# Patient Record
Sex: FEMALE
[De-identification: names, ages, dates, MRNs, and addresses within clinical notes are randomized per-mention and may not be internally consistent; named-entity substitution may affect disease eponyms.]

---

## 2020-03-20 ENCOUNTER — TELEPHONE (OUTPATIENT)
Dept: SCHEDULING | Age: 25
End: 2020-03-20

## 2021-01-23 ENCOUNTER — HOSPITAL ENCOUNTER (EMERGENCY)
Facility: CLINIC | Age: 26
Discharge: HOME | End: 2021-01-23
Attending: EMERGENCY MEDICINE | Admitting: EMERGENCY MEDICINE
Payer: COMMERCIAL

## 2021-01-23 VITALS
OXYGEN SATURATION: 97 % | RESPIRATION RATE: 16 BRPM | BODY MASS INDEX: 19.63 KG/M2 | DIASTOLIC BLOOD PRESSURE: 72 MMHG | TEMPERATURE: 98 F | HEART RATE: 74 BPM | SYSTOLIC BLOOD PRESSURE: 91 MMHG | WEIGHT: 100 LBS | HEIGHT: 60 IN

## 2021-01-23 DIAGNOSIS — N39.0 URINARY TRACT INFECTION WITH HEMATURIA, SITE UNSPECIFIED: ICD-10-CM

## 2021-01-23 DIAGNOSIS — L03.032 PARONYCHIA OF TOE, LEFT: ICD-10-CM

## 2021-01-23 DIAGNOSIS — R31.9 URINARY TRACT INFECTION WITH HEMATURIA, SITE UNSPECIFIED: ICD-10-CM

## 2021-01-23 LAB
ALBUMIN UR-MCNC: NEGATIVE MG/DL
APPEARANCE UR: CLEAR
BILIRUB UR QL STRIP: NEGATIVE
COLOR UR AUTO: ABNORMAL
GLUCOSE UR STRIP-MCNC: NEGATIVE MG/DL
HCG UR QL: NEGATIVE
HGB UR QL STRIP: NEGATIVE
KETONES UR STRIP-MCNC: NEGATIVE MG/DL
LEUKOCYTE ESTERASE UR QL STRIP: ABNORMAL
MUCOUS THREADS #/AREA URNS LPF: PRESENT /LPF
NITRATE UR QL: NEGATIVE
PH UR STRIP: 6.5 PH (ref 5–7)
RBC #/AREA URNS AUTO: 3 /HPF (ref 0–2)
SOURCE: ABNORMAL
SP GR UR STRIP: 1.02 (ref 1–1.03)
SQUAMOUS #/AREA URNS AUTO: 3 /HPF (ref 0–1)
UROBILINOGEN UR STRIP-MCNC: 0 MG/DL (ref 0–2)
WBC #/AREA URNS AUTO: 41 /HPF (ref 0–5)

## 2021-01-23 PROCEDURE — 81025 URINE PREGNANCY TEST: CPT | Performed by: EMERGENCY MEDICINE

## 2021-01-23 PROCEDURE — 87186 SC STD MICRODIL/AGAR DIL: CPT | Performed by: EMERGENCY MEDICINE

## 2021-01-23 PROCEDURE — 87086 URINE CULTURE/COLONY COUNT: CPT | Performed by: EMERGENCY MEDICINE

## 2021-01-23 PROCEDURE — 99283 EMERGENCY DEPT VISIT LOW MDM: CPT | Mod: 25

## 2021-01-23 PROCEDURE — 10060 I&D ABSCESS SIMPLE/SINGLE: CPT

## 2021-01-23 PROCEDURE — 87088 URINE BACTERIA CULTURE: CPT | Performed by: EMERGENCY MEDICINE

## 2021-01-23 PROCEDURE — 81001 URINALYSIS AUTO W/SCOPE: CPT | Performed by: EMERGENCY MEDICINE

## 2021-01-23 RX ORDER — CEPHALEXIN 500 MG/1
500 CAPSULE ORAL 3 TIMES DAILY
Qty: 21 CAPSULE | Refills: 0 | Status: SHIPPED | OUTPATIENT
Start: 2021-01-23 | End: 2021-01-30

## 2021-01-23 ASSESSMENT — ENCOUNTER SYMPTOMS
DYSURIA: 1
HEMATURIA: 1
WOUND: 1

## 2021-01-23 ASSESSMENT — MIFFLIN-ST. JEOR: SCORE: 1125.1

## 2021-01-24 NOTE — ED PROVIDER NOTES
History   Chief Complaint:  Wound Check (Right 2nd toe pain and discoloration for the last 4 days, no known injury.)       SHERON Mayes is a 25 year old female who presents with a wound check. The patient reports for the past 3 days she has had pain and swelling in her second left toe. She thinks the swelling may have improved slightly. She notes she recently started going back to yoga. There was no known trauma to the toe and she has not been wearing new shoes. She does not have a history of diabetes. Additionally, the patient notes she has been having hematuria and dysuria. She has a history of UTIs and took Azo this evening. She is requesting a prescription for Keflex as this has worked well for her in the past.  Denies any vaginal discharge and no concern for STIs.    Review of Systems   Genitourinary: Positive for dysuria and hematuria.   Skin: Positive for wound (left second toe redness and swelling).   All other systems reviewed and are negative.    Allergies:  No Known Drug Allergies     Medications:  The patient does not take any daily medications.    Past Medical History:    The patient denies any medical problems including a history of diabetes.       Social History:  The patient presents with her boyfriend Reji.     Physical Exam     Patient Vitals for the past 24 hrs:   BP Temp Temp src Pulse Resp SpO2 Height Weight   01/23/21 2054 -- -- -- -- -- -- 1.524 m (5') 45.4 kg (100 lb)   01/23/21 2047 91/72 98  F (36.7  C) Temporal 74 16 97 % -- --       Physical Exam  Physical Exam   General:  Sitting on bed with her boyfriend at bedside, comfortable appearing.   HENT:  No obvious trauma to head  Right Ear:  External ear normal.   Left Ear:  External ear normal.   Nose:  Nose normal.   Eyes:  Conjunctivae and EOM are normal.  Neck: Normal range of motion. Neck supple. No tracheal deviation present.   Pulm/Chest: No respiratory distress  M/S: Normal range of motion.   Neuro: Alert. GCS  15.  Skin: Skin is warm and dry. No rash noted. Not diaphoretic. On the left second toe there is an area of erythema and warmth to the distal aspect just proximal to nail and to the medical cuticle area.  Psych: Normal mood and affect. Behavior is normal.     Emergency Department Course     Laboratory:  UA: Clear dark yellow urine, Leukocyte esterase: large, WBC: 41 (H), RBC: 3 (H), Squamous epithelia: 3 (H), Mucous: present, otherwise WNL     HCG qualitative urine: Negative      Urine culture: In process    Procedures  Digital Block  After discussing the risk and benefits of the procedure, the pt consented to a digital block being performed.  The finger was thoroughly prepped with alcohol swabs and then 3.5 ml of 2% lidocaine was used to anesthetize the toe.  The pt tolerated the procedure well and felt much better after achieving anesthesia to the digit. I discussed post-digital block care of the finger.        Incision and Drainage     LOCATIONS:  Left second toe     ANESTHESIA:  Digital block with 3.5 ml of 1% lidocaine without epinephrine     PREPARATION:  Cleansed with alcohol prep     PROCEDURE:  Area was incised with # 11 Blade ([place]) with a Single Straight incision.  Serosanguineous drainage obtained.  Packing consisted of No Packing.  Appropriate dressing was applied to cover the area.    PATIENT STATUS:        Patient tolerated the procedure well. There were no complications.    Emergency Department Course:    Reviewed:  I reviewed nursing notes, vitals and past medical history    Assessments:  2047 I obtained history and examined the patient as noted above.   2105 A digital block and incision and drainage was performed as outlined in the procedure note above.  The patient tolerated well and there were no complications.     Disposition:  The patient was discharged to home.       Impression & Plan   Medical Decision Making:  Nydia Mayes is a very pleasant 25 year old female who presents  for evaluation of a painful left second toe.  The patient has been doing some more yoga.  She did wonder if this could perhaps be a blister, but the pain has been worsening.  And has had a paronychia in the past and thought it was that.  This is consistent with likely an infected blister and possibly a paronychia as well.  I reviewed the risk and benefit of warm compresses/warm water soaks versus incision and drainage.  The patient consented for the latter.  This was performed and revealed a serosanguinous drainage. There is no significant purulence.  The swelling in the paronychia/blister area reduced greatly after this procedure.  Bacitracin and a bandage was applied. No signs of lymphangitis, marked cellulitis, gangrene, or other worrisome soft tissue/bony issue at this time.  Plan to f/u with podiatry or primary for wound recheck.  Bacitracin and wound care discussed BID.  Stable for discharge.      This patient also had concerns of some dysuria.  She has had many urinary tract infections in the past and felt she had another one today.  Urinalysis confirms the infection.  There has been no fever, back/flank pain or significant abdominal pain.  There is no clinical evidence of pyelonephritis, appendicitis,  Colitis, diverticulitis or any intraabdominal catastrophe.  The patient will be started on antibiotics for the infection. Return if increasing pain, vomiting, fever, or inability to tolerate the oral antibiotic.  Follow up with primary physician is indicated if not improving in 2-3 days.     The treatment plan was discussed with the patient and they expressed understanding of this plan and consented to the plan.  In addition, the patient will return to the emergency department if their symptoms persist, worsen, if new symptoms arise or if there is any concern as other pathology may be present that is not evident at this time. They also understand the importance of close follow up in the clinic and if unable to  do so will return to the emergency department for a reevaluation. All questions were answered.    Covid-19  Nydia Mayes was evaluated during a global COVID-19 pandemic, which necessitated consideration that the patient might be at risk for infection with the SARS-CoV-2 virus that causes COVID-19.   Applicable protocols for evaluation were followed during the patient's care.     Diagnosis:    ICD-10-CM    1. Paronychia of toe, left  L03.032 UA with Microscopic     HCG qualitative urine    /infected blister   2. Urinary tract infection with hematuria, site unspecified  N39.0     R31.9        Discharge Medications:  New Prescriptions    CEPHALEXIN (KEFLEX) 500 MG CAPSULE    Take 1 capsule (500 mg) by mouth 3 times daily for 7 days       Scribe Disclosure:  I, Elaina Hanley, am serving as a scribe at 8:47 PM on 1/23/2021 to document services personally performed by Mushtaq Navarrete DO based on my observations and the provider's statements to me.        Mushtaq aNvarrete DO  01/23/21 8943

## 2021-01-25 LAB
BACTERIA SPEC CULT: ABNORMAL
Lab: ABNORMAL
SPECIMEN SOURCE: ABNORMAL

## 2021-01-26 ENCOUNTER — TELEPHONE (OUTPATIENT)
Dept: EMERGENCY MEDICINE | Facility: CLINIC | Age: 26
End: 2021-01-26

## 2021-01-26 RX ORDER — NITROFURANTOIN 25; 75 MG/1; MG/1
100 CAPSULE ORAL 2 TIMES DAILY
Qty: 10 CAPSULE | Refills: 0 | Status: SHIPPED | OUTPATIENT
Start: 2021-01-26 | End: 2021-01-31

## 2021-01-26 NOTE — ED PROVIDER NOTES
Spoke with the nurse providing culture follow-up.  Urine culture revealed Enterococcus faecalis.  The patient is still having urinary symptoms and was prescribed Keflex.  So we decided to discontinue that medication and the Enterococcus is susceptible to nitrofurantoin so a 5-day course will be prescribed.     Nikunj Gamino MD  01/26/21 1100

## 2021-01-26 NOTE — TELEPHONE ENCOUNTER
Nydia notified of new recommendations.  Stop Cephalexin and switch to Macrobid    Contact your PCP clinic or return to the Emergency department if your:    Symptoms do not improved after 3 days on antibiotic.    Symptoms do not resolve after completing antibiotic.    Symptoms worsen or other concerning symptom's.    Marek Gomez RN  [facility]  Emergency Dept Lab Result RN  Ph# [phone]

## 2021-01-26 NOTE — RESULT ENCOUNTER NOTE
At 10:58P, Consulted with [facility] Emergency Department Provider, Dr Gamino, and recommendations were discontinue the Cephalexin and switch to Macrobid 100 mg PO BID for 5 days

## 2021-01-26 NOTE — TELEPHONE ENCOUNTER
[facility] Emergency Department Lab result notification [Adult-Female]    [place] ED lab result protocol used  Urine culture    Reason for call  Notify of lab results, assess symptoms,  review ED providers recommendations/discharge instructions (if necessary) and advise per ED lab result f/u protocol    Lab Result (including Rx patient on, if applicable)  Final Urine Culture Report on 1/25/21  Emergency Dept/Urgent Care discharge antibiotic prescribed: Cephalexin (Keflex) 500 mg capsule, 1 capsule (500 mg) by mouth 3 times daily for 7 days.  #1. Bacteria, 50,000 to 100,000 colonies/mL Enterococcus faecalis,  is [NOT TESTED] to antibiotic.   Change in treatment as per [place] ED Lab result protocol.    Information table from ED Provider visit on 1/26/21  Symptoms reported at ED visit (Chief complaint, HPI) Chief Complaint:  Wound Check (Right 2nd toe pain and discoloration for the last 4 days, no known injury.)   HPI   Nydia Mayes is a 25 year old female who presents with a wound check. The patient reports for the past 3 days she has had pain and swelling in her second left toe. She thinks the swelling may have improved slightly. She notes she recently started going back to yoga. There was no known trauma to the toe and she has not been wearing new shoes. She does not have a history of diabetes. Additionally, the patient notes she has been having hematuria and dysuria. She has a history of UTIs and took Azo this evening. She is requesting a prescription for Keflex as this has worked well for her in the past.  Denies any vaginal discharge and no concern for STIs.   Significant Medical hx, if applicable (i.e. CKD, diabetes) NA   Allergies No Known Allergies   Weight, if applicable Wt Readings from Last 2 Encounters:   01/23/21 45.4 kg (100 lb)      Coumadin/Warfarin [Yes /No] No   Creatinine Level (mg/dl) No results found for: CR   Creatinine clearance (ml/min), if applicable Creatinine  clearance cannot be calculated (No successful lab value found.)   Pregnant (Yes/No/NA) HCG qual negative   Breastfeeding (Yes/No/NA) ?   ED providers Impression and Plan (applicable information) Nydia Mayes is a very pleasant 25 year old female who presents for evaluation of a painful left second toe.  The patient has been doing some more yoga.  She did wonder if this could perhaps be a blister, but the pain has been worsening.  And has had a paronychia in the past and thought it was that.  This is consistent with likely an infected blister and possibly a paronychia as well.  I reviewed the risk and benefit of warm compresses/warm water soaks versus incision and drainage.  The patient consented for the latter.  This was performed and revealed a serosanguinous drainage. There is no significant purulence.  The swelling in the paronychia/blister area reduced greatly after this procedure.  Bacitracin and a bandage was applied. No signs of lymphangitis, marked cellulitis, gangrene, or other worrisome soft tissue/bony issue at this time.  Plan to f/u with podiatry or primary for wound recheck.  Bacitracin and wound care discussed BID.  Stable for discharge.       This patient also had concerns of some dysuria.  She has had many urinary tract infections in the past and felt she had another one today.  Urinalysis confirms the infection.  There has been no fever, back/flank pain or significant abdominal pain.  There is no clinical evidence of pyelonephritis, appendicitis,  Colitis, diverticulitis or any intraabdominal catastrophe.  The patient will be started on antibiotics for the infection. Return if increasing pain, vomiting, fever, or inability to tolerate the oral antibiotic.  Follow up with primary physician is indicated if not improving in 2-3 days.      The treatment plan was discussed with the patient and they expressed understanding of this plan and consented to the plan.  In addition, the patient will  "return to the emergency department if their symptoms persist, worsen, if new symptoms arise or if there is any concern as other pathology may be present that is not evident at this time. They also understand the importance of close follow up in the clinic and if unable to do so will return to the emergency department for a reevaluation. All questions were answered.   ED diagnosis  Paronychia of toe, left   Urinary tract infection with hematuria, site unspecified   ED provider Mushtaq Navarrete, DO      RN Assessment (Patient s current Symptoms), include time called.  [Insert Left message here if message left]  10:45A  Urinary sx's have not improved.  \"They are about the same\"  My toe is just black and blue but it is feeling better.  No pus discharge.  It is not as painful now.    RN Recommendations/Instructions per [facility] ED lab result protocol  10:45A Patient notified of lab result and treatment recommendations.  Will consult ED Provider and call her back.    [facility] Emergency Department Provider Name & Recommendations (included time consulted)  At 10:58P, Consulted with [facility] Emergency Department Provider, Dr Gamino, and recommendations were discontinue the Cephalexin and switch to Macrobid 100 mg PO BID for 5 days    At 11A, Left voicemail message requesting a call back to [facility] ED Lab Result RN at [phone].  RN is available every day between 10 a.m. and 6:30 p.m..       Please Contact your PCP clinic or return to the Emergency department if your:    Symptoms do not improve after 3 days on antibiotic.    Symptoms do not resolve after completing antibiotic.    Symptoms worsen or other concerning symptom's.      [RN Name]  Marek Gomez RN  [facility]  Emergency Dept Lab Result RN  Ph# [phone]    Copy of Lab result   Urine Culture  Order: [id number]  Status:  Final result   Visible to patient:  No [facility]   Dx:  " Paronychia of toe, left  Specimen Information: Midstream Urine        Component 3d ago   Specimen Description Midstream Urine    Special Requests Specimen received in preservative    Culture Micro Abnormal 50,000 to 100,000 colonies/mL   Enterococcus faecalis     [facility]   Susceptibility     Enterococcus faecalis     LATOYA     AMPICILLIN <=2 ug/mL Sensitive     NITROFURANTOIN <=16 ug/mL Sensitive     PENICILLIN 2 ug/mL Sensitive     VANCOMYCIN 1 ug/mL Sensitive                 Specimen Collected: 01/23/21  9:06 PM   Last Resulted: 01/25/21 10:11 PM